# Patient Record
Sex: MALE | Race: WHITE | Employment: FULL TIME | URBAN - METROPOLITAN AREA
[De-identification: names, ages, dates, MRNs, and addresses within clinical notes are randomized per-mention and may not be internally consistent; named-entity substitution may affect disease eponyms.]

---

## 2022-03-18 PROBLEM — N13.30 HYDROURETERONEPHROSIS: Status: ACTIVE | Noted: 2021-09-03

## 2022-03-18 PROBLEM — N20.0 BILATERAL NEPHROLITHIASIS: Status: ACTIVE | Noted: 2021-09-06

## 2022-03-19 PROBLEM — R10.84 ABDOMINAL PAIN, GENERALIZED: Status: ACTIVE | Noted: 2021-09-04

## 2022-03-20 PROBLEM — N20.0 NEPHROLITHIASIS: Status: ACTIVE | Noted: 2021-09-02

## 2022-05-04 PROBLEM — F17.200 TOBACCO DEPENDENCE SYNDROME: Status: ACTIVE | Noted: 2022-05-04

## 2022-05-04 PROBLEM — R51.9 TEMPORAL HEADACHE: Status: ACTIVE | Noted: 2019-02-13

## 2022-05-04 PROBLEM — M54.16 LUMBAR RADICULOPATHY: Status: ACTIVE | Noted: 2019-02-13

## 2022-05-04 PROBLEM — I73.00 RAYNAUD'S PHENOMENON: Status: ACTIVE | Noted: 2022-05-04

## 2022-05-04 PROBLEM — R06.00 DYSPNEA: Status: ACTIVE | Noted: 2019-02-13

## 2022-05-04 PROBLEM — T14.8XXA MUSCLE TEAR: Status: ACTIVE | Noted: 2019-10-22

## 2022-05-04 PROBLEM — L02.91 ABSCESS: Status: ACTIVE | Noted: 2022-05-04

## 2022-05-04 PROBLEM — M54.2 NECK PAIN: Status: ACTIVE | Noted: 2019-02-13

## 2022-05-04 PROBLEM — R07.89 CHEST WALL PAIN: Status: ACTIVE | Noted: 2019-02-13

## 2022-05-04 PROBLEM — J11.1 INFLUENZA-LIKE ILLNESS: Status: ACTIVE | Noted: 2022-05-04

## 2023-06-30 ENCOUNTER — OFFICE VISIT (OUTPATIENT)
Age: 43
End: 2023-06-30
Payer: MEDICAID

## 2023-06-30 VITALS
TEMPERATURE: 98.3 F | RESPIRATION RATE: 17 BRPM | WEIGHT: 152.8 LBS | HEART RATE: 70 BPM | BODY MASS INDEX: 23.98 KG/M2 | SYSTOLIC BLOOD PRESSURE: 102 MMHG | DIASTOLIC BLOOD PRESSURE: 63 MMHG | OXYGEN SATURATION: 95 % | HEIGHT: 67 IN

## 2023-06-30 DIAGNOSIS — Z78.9 TRANSGENDER: ICD-10-CM

## 2023-06-30 DIAGNOSIS — L65.9 HAIR LOSS: Primary | ICD-10-CM

## 2023-06-30 DIAGNOSIS — N52.9 ERECTILE DYSFUNCTION, UNSPECIFIED ERECTILE DYSFUNCTION TYPE: ICD-10-CM

## 2023-06-30 PROCEDURE — 99203 OFFICE O/P NEW LOW 30 MIN: CPT | Performed by: NURSE PRACTITIONER

## 2023-06-30 RX ORDER — TADALAFIL 5 MG/1
5 TABLET ORAL DAILY
COMMUNITY
End: 2023-06-30 | Stop reason: SDUPTHER

## 2023-06-30 RX ORDER — TADALAFIL 5 MG/1
5 TABLET ORAL DAILY
Qty: 30 TABLET | Refills: 1 | Status: SHIPPED | OUTPATIENT
Start: 2023-06-30

## 2023-06-30 RX ORDER — MINOXIDIL 2.5 MG/1
2.5 TABLET ORAL DAILY
Qty: 30 TABLET | Refills: 1 | Status: SHIPPED | OUTPATIENT
Start: 2023-06-30

## 2023-06-30 RX ORDER — MINOXIDIL 2.5 MG/1
2.5 TABLET ORAL DAILY
COMMUNITY
End: 2023-06-30 | Stop reason: SDUPTHER

## 2023-06-30 SDOH — HEALTH STABILITY: PHYSICAL HEALTH: ON AVERAGE, HOW MANY DAYS PER WEEK DO YOU ENGAGE IN MODERATE TO STRENUOUS EXERCISE (LIKE A BRISK WALK)?: 6 DAYS

## 2023-06-30 SDOH — HEALTH STABILITY: PHYSICAL HEALTH: ON AVERAGE, HOW MANY MINUTES DO YOU ENGAGE IN EXERCISE AT THIS LEVEL?: 50 MIN

## 2023-06-30 SDOH — ECONOMIC STABILITY: INCOME INSECURITY: HOW HARD IS IT FOR YOU TO PAY FOR THE VERY BASICS LIKE FOOD, HOUSING, MEDICAL CARE, AND HEATING?: NOT HARD AT ALL

## 2023-06-30 SDOH — ECONOMIC STABILITY: FOOD INSECURITY: WITHIN THE PAST 12 MONTHS, THE FOOD YOU BOUGHT JUST DIDN'T LAST AND YOU DIDN'T HAVE MONEY TO GET MORE.: NEVER TRUE

## 2023-06-30 SDOH — ECONOMIC STABILITY: FOOD INSECURITY: WITHIN THE PAST 12 MONTHS, YOU WORRIED THAT YOUR FOOD WOULD RUN OUT BEFORE YOU GOT MONEY TO BUY MORE.: NEVER TRUE

## 2023-06-30 SDOH — ECONOMIC STABILITY: HOUSING INSECURITY
IN THE LAST 12 MONTHS, WAS THERE A TIME WHEN YOU DID NOT HAVE A STEADY PLACE TO SLEEP OR SLEPT IN A SHELTER (INCLUDING NOW)?: NO

## 2023-06-30 ASSESSMENT — PATIENT HEALTH QUESTIONNAIRE - PHQ9
SUM OF ALL RESPONSES TO PHQ QUESTIONS 1-9: 0
SUM OF ALL RESPONSES TO PHQ9 QUESTIONS 1 & 2: 0
2. FEELING DOWN, DEPRESSED OR HOPELESS: 0
SUM OF ALL RESPONSES TO PHQ QUESTIONS 1-9: 0
1. LITTLE INTEREST OR PLEASURE IN DOING THINGS: 0
SUM OF ALL RESPONSES TO PHQ QUESTIONS 1-9: 0
SUM OF ALL RESPONSES TO PHQ QUESTIONS 1-9: 0

## 2023-06-30 ASSESSMENT — ENCOUNTER SYMPTOMS
CHEST TIGHTNESS: 0
ABDOMINAL PAIN: 0
COUGH: 0
SHORTNESS OF BREATH: 0
VOMITING: 0
NAUSEA: 0

## 2023-07-18 ENCOUNTER — TELEPHONE (OUTPATIENT)
Age: 43
End: 2023-07-18

## 2023-07-18 NOTE — TELEPHONE ENCOUNTER
Patient contacted the endocrinologist but they did not have his information. Patient requesting information to be faxed so he can schedule his appt.

## 2023-07-26 ENCOUNTER — TELEPHONE (OUTPATIENT)
Age: 43
End: 2023-07-26

## 2023-07-26 NOTE — TELEPHONE ENCOUNTER
Patient called stating he call the endocrinologist and was adv to call pcp states they have not received the referral.

## 2023-07-28 ENCOUNTER — OFFICE VISIT (OUTPATIENT)
Age: 43
End: 2023-07-28
Payer: MEDICAID

## 2023-07-28 ENCOUNTER — HOSPITAL ENCOUNTER (OUTPATIENT)
Facility: HOSPITAL | Age: 43
End: 2023-07-28
Payer: MEDICAID

## 2023-07-28 VITALS
RESPIRATION RATE: 20 BRPM | DIASTOLIC BLOOD PRESSURE: 77 MMHG | BODY MASS INDEX: 23.23 KG/M2 | HEIGHT: 67 IN | HEART RATE: 81 BPM | SYSTOLIC BLOOD PRESSURE: 121 MMHG | TEMPERATURE: 99.3 F | OXYGEN SATURATION: 98 % | WEIGHT: 148 LBS

## 2023-07-28 DIAGNOSIS — R05.1 ACUTE COUGH: ICD-10-CM

## 2023-07-28 DIAGNOSIS — G47.00 INSOMNIA, UNSPECIFIED TYPE: ICD-10-CM

## 2023-07-28 DIAGNOSIS — Z09 POSTOP CHECK: ICD-10-CM

## 2023-07-28 DIAGNOSIS — F32.A DEPRESSION, UNSPECIFIED DEPRESSION TYPE: Primary | ICD-10-CM

## 2023-07-28 PROCEDURE — 99211 OFF/OP EST MAY X REQ PHY/QHP: CPT

## 2023-07-28 PROCEDURE — 99213 OFFICE O/P EST LOW 20 MIN: CPT | Performed by: NURSE PRACTITIONER

## 2023-07-28 PROCEDURE — 71046 X-RAY EXAM CHEST 2 VIEWS: CPT

## 2023-07-28 RX ORDER — SERTRALINE HYDROCHLORIDE 25 MG/1
25 TABLET, FILM COATED ORAL DAILY
Qty: 90 TABLET | Refills: 0 | Status: SHIPPED | OUTPATIENT
Start: 2023-07-28

## 2023-07-28 ASSESSMENT — PATIENT HEALTH QUESTIONNAIRE - PHQ9
SUM OF ALL RESPONSES TO PHQ9 QUESTIONS 1 & 2: 3
SUM OF ALL RESPONSES TO PHQ QUESTIONS 1-9: 3
SUM OF ALL RESPONSES TO PHQ QUESTIONS 1-9: 3
1. LITTLE INTEREST OR PLEASURE IN DOING THINGS: 2
SUM OF ALL RESPONSES TO PHQ QUESTIONS 1-9: 3
SUM OF ALL RESPONSES TO PHQ QUESTIONS 1-9: 3
2. FEELING DOWN, DEPRESSED OR HOPELESS: 1

## 2023-07-28 ASSESSMENT — ENCOUNTER SYMPTOMS: COUGH: 1

## 2023-07-28 NOTE — PROGRESS NOTES
1. \"Have you been to the ER, urgent care clinic since your last visit? Hospitalized since your last visit? \" No    2. \"Have you seen or consulted any other health care providers outside of the [address] [place] [address] since your last visit? \" Yes , therapist       3. For patients aged 43-73: Has the patient had a colonoscopy / FIT/ Cologuard?  NA - based on age

## 2023-07-28 NOTE — PROGRESS NOTES
Devon Talbot (:  1980) is a 43 y.o. adult, here for evaluation of the following medical concerns:  Chief Complaint   Patient presents with    Cough     Intermittent and chest heavy feeling. While in surgery the anesthesiologist mentioned she heard crackling noises. Oral Swelling     Knot feeling on the tip of tongue    Depression         ASSESSMENT/PLAN:  1. Depression, unspecified depression type    - sertraline (ZOLOFT) 25 MG tablet; Take 1 tablet by mouth daily  Dispense: 90 tablet; Refill: 0    2. Insomnia, unspecified type    - sertraline (ZOLOFT) 25 MG tablet; Take 1 tablet by mouth daily  Dispense: 90 tablet; Refill: 0    3. Acute cough    - XR CHEST (2 VIEWS); Future    4. Postop check    - XR CHEST (2 VIEWS); Future- xray is wnl      Return in about 2 weeks (around 2023) for MED F/U. Cough    Depression  Tuesday with breast augmentation correction, sites look CDI. Noted with cough, intermittent and chest heavy feeling post anesthesiologist.       Restarted Estradiol as was getting mean, aggressive, noted by work family. Endocrinology is still pending. Will set up appt today. Restarting with psychology next week. Recent loss of girlfriend. Completed masters degree  Took this medication in the past-  Lithum  Adderall  Zoloft  Ritilan  Trazosone  Valium    With hx of insomnia failed otc therapy. Waking every couple of hours. Thought to trial SSRI    Currently on keflex taking every 12 hours has 3 days left            Review of Systems   Respiratory:  Positive for cough. Psychiatric/Behavioral:  Positive for depression. Prior to Visit Medications    Medication Sig Taking?  Authorizing Provider   Estradiol 4 MCG INST Place vaginally Yes Historical Provider, MD   sertraline (ZOLOFT) 25 MG tablet Take 1 tablet by mouth daily Yes CHRIS Sharma NP   tadalafil (CIALIS) 5 MG tablet Take 1 tablet by mouth daily Yes CHRIS Sharma NP   minoxidil (Vera Frank)

## 2023-08-28 DIAGNOSIS — G47.00 INSOMNIA, UNSPECIFIED TYPE: ICD-10-CM

## 2023-08-28 DIAGNOSIS — F32.A DEPRESSION, UNSPECIFIED DEPRESSION TYPE: ICD-10-CM

## 2023-08-28 RX ORDER — SERTRALINE HYDROCHLORIDE 25 MG/1
25 TABLET, FILM COATED ORAL DAILY
Qty: 90 TABLET | Refills: 0 | OUTPATIENT
Start: 2023-08-28

## 2023-09-07 NOTE — TELEPHONE ENCOUNTER
Last appointment: 07/28/2023    Next appointment: 09/22/2023    Patient requesting refill for     sildenafil 25mg     [facility] [address] [place] [address], [address] [place] [address],[address] [place], [place] [id number]   Phone:  [phone]  Fax:  [phone]

## 2023-09-15 RX ORDER — SILDENAFIL 25 MG/1
25 TABLET, FILM COATED ORAL PRN
COMMUNITY

## 2023-09-15 RX ORDER — SILDENAFIL 25 MG/1
25 TABLET, FILM COATED ORAL PRN
Qty: 30 TABLET | Refills: 0 | OUTPATIENT
Start: 2023-09-15

## 2023-09-22 ENCOUNTER — OFFICE VISIT (OUTPATIENT)
Age: 43
End: 2023-09-22
Payer: MEDICAID

## 2023-09-22 VITALS
TEMPERATURE: 97.5 F | SYSTOLIC BLOOD PRESSURE: 107 MMHG | DIASTOLIC BLOOD PRESSURE: 63 MMHG | HEART RATE: 68 BPM | RESPIRATION RATE: 17 BRPM | WEIGHT: 149.8 LBS | OXYGEN SATURATION: 97 % | BODY MASS INDEX: 23.51 KG/M2 | HEIGHT: 67 IN

## 2023-09-22 DIAGNOSIS — R09.89 BILATERAL CAROTID BRUITS: ICD-10-CM

## 2023-09-22 DIAGNOSIS — Z23 IMMUNIZATION DUE: ICD-10-CM

## 2023-09-22 DIAGNOSIS — R01.1 CARDIAC MURMUR: ICD-10-CM

## 2023-09-22 DIAGNOSIS — Z00.00 ANNUAL PHYSICAL EXAM: ICD-10-CM

## 2023-09-22 DIAGNOSIS — Z12.5 PROSTATE CANCER SCREENING: ICD-10-CM

## 2023-09-22 DIAGNOSIS — Z11.59 ENCOUNTER FOR HEPATITIS C SCREENING TEST FOR LOW RISK PATIENT: Primary | ICD-10-CM

## 2023-09-22 PROCEDURE — 99211 OFF/OP EST MAY X REQ PHY/QHP: CPT

## 2023-09-22 PROCEDURE — 99396 PREV VISIT EST AGE 40-64: CPT | Performed by: NURSE PRACTITIONER

## 2023-09-22 ASSESSMENT — PATIENT HEALTH QUESTIONNAIRE - PHQ9
SUM OF ALL RESPONSES TO PHQ QUESTIONS 1-9: 0
SUM OF ALL RESPONSES TO PHQ9 QUESTIONS 1 & 2: 0
2. FEELING DOWN, DEPRESSED OR HOPELESS: 0
SUM OF ALL RESPONSES TO PHQ QUESTIONS 1-9: 0
1. LITTLE INTEREST OR PLEASURE IN DOING THINGS: 0

## 2023-09-22 ASSESSMENT — ENCOUNTER SYMPTOMS
SHORTNESS OF BREATH: 0
COUGH: 0
CHEST TIGHTNESS: 0
NAUSEA: 0
VOMITING: 0
ABDOMINAL PAIN: 0

## 2023-09-22 NOTE — PROGRESS NOTES
Tamia Chang (:  1980) is a 37 y.o. adult, here for evaluation of the following medical concerns:  Chief Complaint   Patient presents with    Annual Exam         ASSESSMENT/PLAN:  1. Encounter for hepatitis C screening test for low risk patient    - Hepatitis Be antigen; Future    2. Annual physical exam    - CBC with Auto Differential; Future  - Comprehensive Metabolic Panel; Future  - Hemoglobin A1C; Future  - Lipid Panel; Future  - TSH; Future  - PSA Screening; Future  - Hepatitis C Antibody; Future  - Hepatitis Be antigen; Future  - HIV 1/2 Ag/Ab, 4TH Generation,W Rflx Confirm; Future  - T. pallidum Ab; Future  - Chlamydia, Gonorrhea, Trichomoniasis; Future  - Urinalysis with Microscopic; Future    3. Prostate cancer screening    - PSA Screening; Future    4. Immunization due        Return in about 1 year (around 2024), or if symptoms worsen or fail to improve, for [id number], [facility] VISIT. HPI  They are here today for annual physical exam  Manoj Costello is a transgender male to female. Breast augmentation, referred to endocrinology. Stopped Estradiol. Restarting with psychology next week. Recent loss of girlfriend. Completed masters degree  Took this medication in the past-  Lithum  Adderall  Zoloft  Ritilan  Trazosone  Valium     With hx of insomnia failed otc therapy. Waking every couple of hours. Thought to trial SSRI- started on Zoloft       Review of Systems   Constitutional:  Negative for diaphoresis and fatigue. Respiratory:  Negative for cough, chest tightness and shortness of breath. Cardiovascular:  Negative for chest pain, palpitations and leg swelling. Gastrointestinal:  Negative for abdominal pain, nausea and vomiting. Neurological:  Negative for dizziness, weakness and headaches. Prior to Visit Medications    Medication Sig Taking?  Authorizing Provider   sildenafil (VIAGRA) 25 MG tablet Take 1 tablet by mouth as needed for Erectile Dysfunction

## 2023-09-22 NOTE — PROGRESS NOTES
1. \"Have you been to the ER, urgent care clinic since your last visit? Hospitalized since your last visit? \" No    2. \"Have you seen or consulted any other health care providers outside of the [address] [place] [address] since your last visit? \" Yes therapist.       3. For patients aged 43-73: Has the patient had a colonoscopy / FIT/ Cologuard?  NA - based on age

## 2023-09-25 LAB
A/G RATIO: 2.1 RATIO (ref 1.1–2.6)
ALBUMIN SERPL-MCNC: 5 G/DL (ref 3.5–5)
ALP BLD-CCNC: 65 U/L (ref 25–115)
ALT SERPL-CCNC: 19 U/L (ref 5–40)
ANION GAP SERPL CALCULATED.3IONS-SCNC: 10 MMOL/L (ref 3–15)
AST SERPL-CCNC: 26 U/L (ref 10–37)
AVERAGE GLUCOSE: 111 MG/DL (ref 91–123)
BACTERIA: NEGATIVE
BASOPHILS # BLD: 1 % (ref 0–2)
BASOPHILS ABSOLUTE: 0.1 K/UL (ref 0–0.2)
BILIRUB SERPL-MCNC: 0.4 MG/DL (ref 0.2–1.2)
BILIRUB SERPL-MCNC: NEGATIVE MG/DL
BLOOD: NEGATIVE
BUN BLDV-MCNC: 9 MG/DL (ref 6–22)
CALCIUM SERPL-MCNC: 9.7 MG/DL (ref 8.4–10.5)
CHLAMYDIA AMPLIFIED URINE: NEGATIVE
CHLORIDE BLD-SCNC: 102 MMOL/L (ref 98–110)
CHOLESTEROL/HDL RATIO: 4.7 (ref 0–5)
CHOLESTEROL: 198 MG/DL (ref 110–200)
CLARITY: CLEAR
CO2: 31 MMOL/L (ref 20–32)
COLOR: YELLOW
CREAT SERPL-MCNC: 0.8 MG/DL (ref 0.5–1.2)
EOSINOPHIL # BLD: 2 % (ref 0–6)
EOSINOPHILS ABSOLUTE: 0.1 K/UL (ref 0–0.5)
EPITHELIAL CELLS: NORMAL /HPF
GC AMPLIFIED URINE: NEGATIVE
GLOBULIN: 2.4 G/DL (ref 2–4)
GLOMERULAR FILTRATION RATE: >60 ML/MIN/1.73 SQ.M.
GLUCOSE: 95 MG/DL (ref 70–99)
GLUCOSE: NEGATIVE MG/DL
HBA1C MFR BLD: 5.5 % (ref 4.8–5.6)
HCT VFR BLD CALC: 46.4 % (ref 36.6–51.9)
HDLC SERPL-MCNC: 42 MG/DL
HEMOGLOBIN: 14 G/DL (ref 13.2–17.3)
HEPATITIS BE ANTIGEN: NONREACTIVE
HEPATITIS C ANTIBODY: NORMAL
HIV -1/0/2 AG/AB WITH REFLEX: NON REACTIVE
HIV INTERPRETATION: NORMAL
HYALINE CASTS: NORMAL /LPF (ref 0–2)
KETONES, URINE: NEGATIVE MG/DL
LDL CHOLESTEROL CALCULATED: 102 MG/DL (ref 50–99)
LDL/HDL RATIO: 2.4
LEUKOCYTE ESTERASE, URINE: NEGATIVE
LYMPHOCYTES # BLD: 28 % (ref 20–45)
LYMPHOCYTES ABSOLUTE: 1.7 K/UL (ref 1–4.8)
MCH RBC QN AUTO: 30 PG (ref 26–34)
MCHC RBC AUTO-ENTMCNC: 30 G/DL (ref 31–36)
MCV RBC AUTO: 99 FL (ref 80–95)
MONOCYTES ABSOLUTE: 0.5 K/UL (ref 0.1–1)
MONOCYTES: 9 % (ref 3–12)
NEUTROPHILS ABSOLUTE: 3.7 K/UL (ref 1.8–7.7)
NEUTROPHILS: 61 % (ref 40–75)
NITRITE, URINE: NEGATIVE
NON-HDL CHOLESTEROL: 156 MG/DL
PDW BLD-RTO: 14.6 % (ref 10–15.5)
PH, URINE: 6.5 PH (ref 5–8)
PLATELET # BLD: 235 K/UL (ref 140–440)
PMV BLD AUTO: 13.4 FL (ref 9–13)
POTASSIUM SERPL-SCNC: 4.3 MMOL/L (ref 3.5–5.5)
PROSTATE SPECIFIC ANTIGEN: 0.85 NG/ML
PROTEIN UA: NEGATIVE MG/DL
RBC URINE: NORMAL /HPF
RBC: 4.68 M/UL (ref 3.8–5.8)
SODIUM BLD-SCNC: 143 MMOL/L (ref 133–145)
SPECIFIC GRAVITY: 1.02 (ref 1–1.03)
T. PALLIDUM, IGG/IGM: NON REACTIVE
TOTAL PROTEIN: 7.4 G/DL (ref 6.4–8.3)
TRICH NUCU: NEGATIVE
TRIGL SERPL-MCNC: 267 MG/DL (ref 40–149)
TSH SERPL DL<=0.05 MIU/L-ACNC: 1.04 MCU/ML (ref 0.27–4.2)
UROBILINOGEN: 0.2 MG/DL
VLDLC SERPL CALC-MCNC: 53 MG/DL (ref 8–30)
WBC UA: NORMAL /HPF (ref 0–2)
WBC: 6.2 K/UL (ref 4–11)

## 2023-09-29 ENCOUNTER — OFFICE VISIT (OUTPATIENT)
Age: 43
End: 2023-09-29
Payer: MEDICAID

## 2023-09-29 VITALS
HEIGHT: 67 IN | OXYGEN SATURATION: 98 % | SYSTOLIC BLOOD PRESSURE: 100 MMHG | DIASTOLIC BLOOD PRESSURE: 80 MMHG | HEART RATE: 66 BPM | BODY MASS INDEX: 23.39 KG/M2 | WEIGHT: 149 LBS

## 2023-09-29 DIAGNOSIS — R01.1 CARDIAC MURMUR: Primary | ICD-10-CM

## 2023-09-29 DIAGNOSIS — R09.89 BILATERAL CAROTID BRUITS: ICD-10-CM

## 2023-09-29 PROCEDURE — 99204 OFFICE O/P NEW MOD 45 MIN: CPT | Performed by: INTERNAL MEDICINE

## 2023-09-29 PROCEDURE — 93000 ELECTROCARDIOGRAM COMPLETE: CPT | Performed by: INTERNAL MEDICINE

## 2023-09-29 ASSESSMENT — PATIENT HEALTH QUESTIONNAIRE - PHQ9
10. IF YOU CHECKED OFF ANY PROBLEMS, HOW DIFFICULT HAVE THESE PROBLEMS MADE IT FOR YOU TO DO YOUR WORK, TAKE CARE OF THINGS AT HOME, OR GET ALONG WITH OTHER PEOPLE: 0
7. TROUBLE CONCENTRATING ON THINGS, SUCH AS READING THE NEWSPAPER OR WATCHING TELEVISION: 0
1. LITTLE INTEREST OR PLEASURE IN DOING THINGS: 0
SUM OF ALL RESPONSES TO PHQ QUESTIONS 1-9: 0
6. FEELING BAD ABOUT YOURSELF - OR THAT YOU ARE A FAILURE OR HAVE LET YOURSELF OR YOUR FAMILY DOWN: 0
8. MOVING OR SPEAKING SO SLOWLY THAT OTHER PEOPLE COULD HAVE NOTICED. OR THE OPPOSITE, BEING SO FIGETY OR RESTLESS THAT YOU HAVE BEEN MOVING AROUND A LOT MORE THAN USUAL: 0
SUM OF ALL RESPONSES TO PHQ QUESTIONS 1-9: 0
2. FEELING DOWN, DEPRESSED OR HOPELESS: 0
SUM OF ALL RESPONSES TO PHQ QUESTIONS 1-9: 0
3. TROUBLE FALLING OR STAYING ASLEEP: 0
5. POOR APPETITE OR OVEREATING: 0
9. THOUGHTS THAT YOU WOULD BE BETTER OFF DEAD, OR OF HURTING YOURSELF: 0
SUM OF ALL RESPONSES TO PHQ9 QUESTIONS 1 & 2: 0
4. FEELING TIRED OR HAVING LITTLE ENERGY: 0
SUM OF ALL RESPONSES TO PHQ QUESTIONS 1-9: 0

## 2023-09-29 NOTE — PROGRESS NOTES
Arita Sacks presents today for   Chief Complaint   Patient presents with    New Patient     Est care referred by pcp due to bilateral carotid bruits and cardiac murmur     Shortness of Breath     SOB with exertion        Arita Sacks preferred language for health care discussion is english/other. Is someone accompanying this pt? no    Is the patient using any DME equipment during OV? no    Depression Screening:  Depression: Not at risk (9/29/2023)    PHQ-2     PHQ-2 Score: 0   Recent Concern: Depression - At risk (7/28/2023)    PHQ-2     PHQ-2 Score: 3        Learning Assessment:  Who is the primary learner? Patient    What is the preferred language for health care of the primary learner? ENGLISH    How does the primary learner prefer to learn new concepts? DEMONSTRATION    Answered By patient    Relationship to Learner SELF           Pt currently taking Anticoagulant therapy? no    Pt currently taking Antiplatelet therapy ? no      Coordination of Care:  1. Have you been to the ER, urgent care clinic since your last visit? Hospitalized since your last visit? no    2. Have you seen or consulted any other health care providers outside of the [address] [place] [address] since your last visit? Include any pap smears or colon screening.  no
with:    Murmur  IVCD/ nonischemic WNL ekg  Nicotine  Depression    Echo, will call with results if benign can see me as needed  Further recs to follow pending testing    Thank you for allowing me to participate in the care of your patient, please do not hesitate to call with questions or concerns.     [facility],    Nabila Gomes, DO

## 2023-11-04 DIAGNOSIS — G47.00 INSOMNIA, UNSPECIFIED TYPE: ICD-10-CM

## 2023-11-04 DIAGNOSIS — F32.A DEPRESSION, UNSPECIFIED DEPRESSION TYPE: ICD-10-CM

## 2023-11-06 NOTE — TELEPHONE ENCOUNTER
Last Visit: 09- OV   Next Appointment: none  Previous Refill Encounter: 07- #90tabs with 0 refills      Requested Prescriptions     Pending Prescriptions Disp Refills    sertraline (ZOLOFT) 25 MG tablet 90 tablet 0     Sig: Take 1 tablet by mouth daily

## 2023-11-07 RX ORDER — SERTRALINE HYDROCHLORIDE 25 MG/1
25 TABLET, FILM COATED ORAL DAILY
Qty: 90 TABLET | Refills: 1 | Status: SHIPPED | OUTPATIENT
Start: 2023-11-07

## 2023-12-11 NOTE — TELEPHONE ENCOUNTER
Last Visit: 09- OV   Next Appointment: none  Previous Refill Encounter: 09- #30 tabs with 0 refills      Requested Prescriptions     Pending Prescriptions Disp Refills    sildenafil (VIAGRA) 25 MG tablet [Pharmacy Med Name: Sildenafil Citrate 25 MG Oral Tablet] 30 tablet 0     Sig: TAKE 1 TABLET BY MOUTH AS NEEDED FOR ERECTILE DYSFUNCTION

## 2023-12-15 RX ORDER — SILDENAFIL 25 MG/1
25 TABLET, FILM COATED ORAL PRN
Qty: 30 TABLET | Refills: 0 | OUTPATIENT
Start: 2023-12-15

## 2023-12-17 RX ORDER — SILDENAFIL 25 MG/1
25 TABLET, FILM COATED ORAL PRN
Qty: 30 TABLET | Refills: 0 | Status: SHIPPED | OUTPATIENT
Start: 2023-12-17

## 2024-01-24 ENCOUNTER — OFFICE VISIT (OUTPATIENT)
Age: 44
End: 2024-01-24
Payer: MEDICAID

## 2024-01-24 VITALS
DIASTOLIC BLOOD PRESSURE: 70 MMHG | RESPIRATION RATE: 17 BRPM | BODY MASS INDEX: 24.23 KG/M2 | SYSTOLIC BLOOD PRESSURE: 117 MMHG | HEART RATE: 74 BPM | OXYGEN SATURATION: 97 % | HEIGHT: 67 IN | TEMPERATURE: 99 F | WEIGHT: 154.4 LBS

## 2024-01-24 DIAGNOSIS — F41.9 ANXIETY: ICD-10-CM

## 2024-01-24 DIAGNOSIS — F32.A DEPRESSION, UNSPECIFIED DEPRESSION TYPE: Primary | ICD-10-CM

## 2024-01-24 DIAGNOSIS — R40.0 DAYTIME SLEEPINESS: ICD-10-CM

## 2024-01-24 DIAGNOSIS — N52.9 ERECTILE DYSFUNCTION, UNSPECIFIED ERECTILE DYSFUNCTION TYPE: ICD-10-CM

## 2024-01-24 DIAGNOSIS — R06.83 SNORING: ICD-10-CM

## 2024-01-24 DIAGNOSIS — Z23 IMMUNIZATION DUE: ICD-10-CM

## 2024-01-24 DIAGNOSIS — G47.00 INSOMNIA, UNSPECIFIED TYPE: ICD-10-CM

## 2024-01-24 PROCEDURE — 99214 OFFICE O/P EST MOD 30 MIN: CPT | Performed by: NURSE PRACTITIONER

## 2024-01-24 NOTE — PROGRESS NOTES
1. \"Have you been to the ER, urgent care clinic since your last visit?  Hospitalized since your last visit?\" No    2. \"Have you seen or consulted any other health care providers outside of the [facility] since your last visit?\" Yes endocrinologist and therapist Denia walter       3. For patients aged 45-75: Has the patient had a colonoscopy / FIT/ Cologuard? NA - based on age    
UROLOGICAL SURGERY  09/03/2021    CYSTOSCOPY, BILATERAL RETROGRADE PYELOGRAMS, BILATERAL URETEROSCOPIES AND LASER LITHOTRIPSY, Bilateral 6fr x 26cm stents; Dr. Avila     Family History   Problem Relation Age of Onset    Osteoarthritis Mother         One of many problems    Lung Disease Father     Asthma Father     Cancer Father         Prostate - Current    No Known Problems Sister     No Known Problems Brother        Lab Results   Component Value Date    WBC 6.2 09/22/2023    HGB 14.0 09/22/2023    HCT 46.4 09/22/2023    MCV 99 (H) 09/22/2023     09/22/2023    LABLYMP 28 09/22/2023    LYMPHOPCT 39 09/07/2021    RBC 4.68 09/22/2023    MCH 30 09/22/2023    MCHC 30 (L) 09/22/2023    RDW 14.6 09/22/2023     Lab Results   Component Value Date     09/22/2023    K 4.3 09/22/2023     09/22/2023    CO2 31 09/22/2023    BUN 9 09/22/2023    CREATININE 0.8 09/22/2023    GLUCOSE 95 09/22/2023    CALCIUM 9.7 09/22/2023    PROT 7.4 09/22/2023    LABALBU 5.0 09/22/2023    BILITOT 0.4 09/22/2023    ALKPHOS 65 09/22/2023    AST 26 09/22/2023    ALT 19 09/22/2023    GFRAA >60 09/07/2021    AGRATIO 2.1 09/22/2023    GLOB 2.4 09/22/2023     Lab Results   Component Value Date    TSH 1.04 09/22/2023     Hemoglobin A1C   Date Value Ref Range Status   09/22/2023 5.5 4.8 - 5.6 % Final     Lab Results   Component Value Date    CHOL 198 09/22/2023     Lab Results   Component Value Date    TRIG 267 (H) 09/22/2023     Lab Results   Component Value Date    HDL 42 09/22/2023     Lab Results   Component Value Date    LDLCALC 102 (H) 09/22/2023     No results found for: \"VLDL\"    Lab Results   Component Value Date    CHOLHDLRATIO 4.7 09/22/2023         Physical Exam  Constitutional:       General: Urszula Bennett is not in acute distress.     Appearance: Normal appearance.   HENT:      Head: Normocephalic and atraumatic.   Pulmonary:      Effort: Pulmonary effort is normal.   Neurological:      General: No focal deficit present.

## 2024-03-05 RX ORDER — SILDENAFIL 25 MG/1
25 TABLET, FILM COATED ORAL PRN
Qty: 30 TABLET | Refills: 0 | OUTPATIENT
Start: 2024-03-05

## 2024-03-08 RX ORDER — SILDENAFIL 25 MG/1
25 TABLET, FILM COATED ORAL PRN
Qty: 30 TABLET | Refills: 0 | OUTPATIENT
Start: 2024-03-08

## 2024-03-19 DIAGNOSIS — N52.9 ERECTILE DYSFUNCTION, UNSPECIFIED ERECTILE DYSFUNCTION TYPE: Primary | ICD-10-CM

## 2024-03-19 RX ORDER — SILDENAFIL 50 MG/1
TABLET, FILM COATED ORAL
Qty: 30 TABLET | Refills: 0 | Status: SHIPPED | OUTPATIENT
Start: 2024-03-19

## 2024-09-23 ENCOUNTER — OFFICE VISIT (OUTPATIENT)
Facility: CLINIC | Age: 44
End: 2024-09-23
Payer: COMMERCIAL

## 2024-09-23 VITALS
RESPIRATION RATE: 16 BRPM | TEMPERATURE: 97.7 F | HEART RATE: 61 BPM | BODY MASS INDEX: 24.33 KG/M2 | SYSTOLIC BLOOD PRESSURE: 123 MMHG | OXYGEN SATURATION: 98 % | DIASTOLIC BLOOD PRESSURE: 70 MMHG | WEIGHT: 155 LBS | HEIGHT: 67 IN

## 2024-09-23 DIAGNOSIS — Z23 IMMUNIZATION DUE: ICD-10-CM

## 2024-09-23 DIAGNOSIS — Z00.00 ANNUAL PHYSICAL EXAM: Primary | ICD-10-CM

## 2024-09-23 DIAGNOSIS — Z12.5 PROSTATE CANCER SCREENING: ICD-10-CM

## 2024-09-23 DIAGNOSIS — B07.9 WARTS OF FOOT: ICD-10-CM

## 2024-09-23 DIAGNOSIS — L82.1 SEBORRHEIC KERATOSIS: ICD-10-CM

## 2024-09-23 PROCEDURE — 99396 PREV VISIT EST AGE 40-64: CPT | Performed by: NURSE PRACTITIONER

## 2024-09-23 SDOH — ECONOMIC STABILITY: INCOME INSECURITY: HOW HARD IS IT FOR YOU TO PAY FOR THE VERY BASICS LIKE FOOD, HOUSING, MEDICAL CARE, AND HEATING?: NOT VERY HARD

## 2024-09-23 SDOH — ECONOMIC STABILITY: FOOD INSECURITY: WITHIN THE PAST 12 MONTHS, YOU WORRIED THAT YOUR FOOD WOULD RUN OUT BEFORE YOU GOT MONEY TO BUY MORE.: NEVER TRUE

## 2024-09-23 SDOH — ECONOMIC STABILITY: FOOD INSECURITY: WITHIN THE PAST 12 MONTHS, THE FOOD YOU BOUGHT JUST DIDN'T LAST AND YOU DIDN'T HAVE MONEY TO GET MORE.: NEVER TRUE

## 2024-09-23 ASSESSMENT — ENCOUNTER SYMPTOMS
COUGH: 0
CHEST TIGHTNESS: 0
SHORTNESS OF BREATH: 0
NAUSEA: 0
VOMITING: 0
ABDOMINAL PAIN: 0

## 2024-10-03 ENCOUNTER — HOSPITAL ENCOUNTER (OUTPATIENT)
Facility: HOSPITAL | Age: 44
Setting detail: SPECIMEN
Discharge: HOME | End: 2024-10-06
Payer: COMMERCIAL

## 2024-10-03 DIAGNOSIS — Z12.5 PROSTATE CANCER SCREENING: ICD-10-CM

## 2024-10-03 DIAGNOSIS — Z00.00 ANNUAL PHYSICAL EXAM: ICD-10-CM

## 2024-10-03 LAB
ALBUMIN SERPL-MCNC: 4.4 G/DL (ref 3.4–5)
ALBUMIN/GLOB SERPL: 1.5 (ref 0.8–1.7)
ALP SERPL-CCNC: 53 U/L (ref 45–117)
ALT SERPL-CCNC: 33 U/L (ref 16–61)
ANION GAP SERPL CALC-SCNC: 3 MMOL/L (ref 3–18)
AST SERPL-CCNC: 23 U/L (ref 10–38)
BASOPHILS # BLD: 0.1 K/UL (ref 0–0.1)
BASOPHILS NFR BLD: 1 % (ref 0–2)
BILIRUB SERPL-MCNC: 0.4 MG/DL (ref 0.2–1)
BUN SERPL-MCNC: 14 MG/DL (ref 7–18)
BUN/CREAT SERPL: 15 (ref 12–20)
CALCIUM SERPL-MCNC: 9.5 MG/DL (ref 8.5–10.1)
CHLORIDE SERPL-SCNC: 105 MMOL/L (ref 100–111)
CHOLEST SERPL-MCNC: 202 MG/DL
CO2 SERPL-SCNC: 30 MMOL/L (ref 21–32)
CREAT SERPL-MCNC: 0.92 MG/DL (ref 0.6–1.3)
DIFFERENTIAL METHOD BLD: ABNORMAL
EOSINOPHIL # BLD: 0.1 K/UL (ref 0–0.4)
EOSINOPHIL NFR BLD: 2 % (ref 0–5)
ERYTHROCYTE [DISTWIDTH] IN BLOOD BY AUTOMATED COUNT: 13.6 % (ref 11.6–14.5)
EST. AVERAGE GLUCOSE BLD GHB EST-MCNC: 111 MG/DL
GLOBULIN SER CALC-MCNC: 2.9 G/DL (ref 2–4)
GLUCOSE SERPL-MCNC: 100 MG/DL (ref 74–99)
HBA1C MFR BLD: 5.5 % (ref 4.2–5.6)
HCT VFR BLD AUTO: 47.1 % (ref 36–48)
HDLC SERPL-MCNC: 53 MG/DL (ref 40–60)
HDLC SERPL: 3.8 (ref 0–5)
HGB BLD-MCNC: 14.7 G/DL (ref 13–16)
IMM GRANULOCYTES # BLD AUTO: 0 K/UL (ref 0–0.04)
IMM GRANULOCYTES NFR BLD AUTO: 0 % (ref 0–0.5)
LDLC SERPL CALC-MCNC: 126.2 MG/DL (ref 0–100)
LIPID PANEL: ABNORMAL
LYMPHOCYTES # BLD: 1.3 K/UL (ref 0.9–3.6)
LYMPHOCYTES NFR BLD: 24 % (ref 21–52)
MCH RBC QN AUTO: 29.6 PG (ref 24–34)
MCHC RBC AUTO-ENTMCNC: 31.2 G/DL (ref 31–37)
MCV RBC AUTO: 95 FL (ref 78–100)
MONOCYTES # BLD: 0.5 K/UL (ref 0.05–1.2)
MONOCYTES NFR BLD: 10 % (ref 3–10)
NEUTS SEG # BLD: 3.5 K/UL (ref 1.8–8)
NEUTS SEG NFR BLD: 63 % (ref 40–73)
NRBC # BLD: 0 K/UL (ref 0–0.01)
NRBC BLD-RTO: 0 PER 100 WBC
PLATELET # BLD AUTO: 246 K/UL (ref 135–420)
PMV BLD AUTO: 12.4 FL (ref 9.2–11.8)
POTASSIUM SERPL-SCNC: 4.8 MMOL/L (ref 3.5–5.5)
PROT SERPL-MCNC: 7.3 G/DL (ref 6.4–8.2)
PSA SERPL-MCNC: 0.8 NG/ML (ref 0–4)
RBC # BLD AUTO: 4.96 M/UL (ref 4.35–5.65)
SODIUM SERPL-SCNC: 138 MMOL/L (ref 136–145)
TRIGL SERPL-MCNC: 114 MG/DL
VLDLC SERPL CALC-MCNC: 22.8 MG/DL
WBC # BLD AUTO: 5.5 K/UL (ref 4.6–13.2)

## 2024-10-03 PROCEDURE — 36415 COLL VENOUS BLD VENIPUNCTURE: CPT

## 2024-10-03 PROCEDURE — G0103 PSA SCREENING: HCPCS

## 2024-10-03 PROCEDURE — 80061 LIPID PANEL: CPT

## 2024-10-03 PROCEDURE — 85025 COMPLETE CBC W/AUTO DIFF WBC: CPT

## 2024-10-03 PROCEDURE — 83036 HEMOGLOBIN GLYCOSYLATED A1C: CPT

## 2024-10-03 PROCEDURE — 80053 COMPREHEN METABOLIC PANEL: CPT

## 2024-10-14 ENCOUNTER — OFFICE VISIT (OUTPATIENT)
Age: 44
End: 2024-10-14
Payer: COMMERCIAL

## 2024-10-14 VITALS
RESPIRATION RATE: 18 BRPM | WEIGHT: 155 LBS | DIASTOLIC BLOOD PRESSURE: 69 MMHG | OXYGEN SATURATION: 99 % | HEIGHT: 67 IN | BODY MASS INDEX: 24.33 KG/M2 | SYSTOLIC BLOOD PRESSURE: 128 MMHG | TEMPERATURE: 98.8 F | HEART RATE: 76 BPM

## 2024-10-14 DIAGNOSIS — F17.200 TOBACCO DEPENDENCE SYNDROME: ICD-10-CM

## 2024-10-14 DIAGNOSIS — R06.83 LOUD SNORING: ICD-10-CM

## 2024-10-14 DIAGNOSIS — R29.818 SUSPECTED SLEEP APNEA: Primary | ICD-10-CM

## 2024-10-14 DIAGNOSIS — F51.12 BEHAVIORALLY INDUCED INSUFFICIENT SLEEP SYNDROME: ICD-10-CM

## 2024-10-14 DIAGNOSIS — R35.1 NOCTURIA: ICD-10-CM

## 2024-10-14 DIAGNOSIS — R06.81 WITNESSED APNEIC SPELLS: ICD-10-CM

## 2024-10-14 DIAGNOSIS — G47.19 EXCESSIVE DAYTIME SLEEPINESS: ICD-10-CM

## 2024-10-14 DIAGNOSIS — J43.8 OTHER EMPHYSEMA (HCC): ICD-10-CM

## 2024-10-14 DIAGNOSIS — G47.63 BRUXISM, SLEEP-RELATED: ICD-10-CM

## 2024-10-14 PROBLEM — F90.9 ATTENTION-DEFICIT HYPERACTIVITY DISORDER: Status: ACTIVE | Noted: 2024-10-14

## 2024-10-14 PROBLEM — J44.9 CHRONIC OBSTRUCTIVE PULMONARY DISEASE (HCC): Status: ACTIVE | Noted: 2024-10-14

## 2024-10-14 PROBLEM — J45.909 ASTHMA WITHOUT STATUS ASTHMATICUS: Status: ACTIVE | Noted: 2024-10-14

## 2024-10-14 PROCEDURE — 99204 OFFICE O/P NEW MOD 45 MIN: CPT | Performed by: OTOLARYNGOLOGY

## 2024-10-14 ASSESSMENT — SLEEP AND FATIGUE QUESTIONNAIRES
HOW LIKELY ARE YOU TO NOD OFF OR FALL ASLEEP WHILE SITTING AND READING: MODERATE CHANCE OF DOZING
HOW LIKELY ARE YOU TO NOD OFF OR FALL ASLEEP WHEN YOU ARE A PASSENGER IN A CAR FOR AN HOUR WITHOUT A BREAK: HIGH CHANCE OF DOZING
HOW LIKELY ARE YOU TO NOD OFF OR FALL ASLEEP WHILE SITTING INACTIVE IN A PUBLIC PLACE: WOULD NEVER DOZE
HOW LIKELY ARE YOU TO NOD OFF OR FALL ASLEEP WHILE LYING DOWN TO REST IN THE AFTERNOON WHEN CIRCUMSTANCES PERMIT: MODERATE CHANCE OF DOZING
HOW LIKELY ARE YOU TO NOD OFF OR FALL ASLEEP WHILE SITTING QUIETLY AFTER LUNCH WITHOUT ALCOHOL: MODERATE CHANCE OF DOZING
ESS TOTAL SCORE: 12
HOW LIKELY ARE YOU TO NOD OFF OR FALL ASLEEP IN A CAR, WHILE STOPPED FOR A FEW MINUTES IN TRAFFIC: SLIGHT CHANCE OF DOZING
HOW LIKELY ARE YOU TO NOD OFF OR FALL ASLEEP WHILE WATCHING TV: MODERATE CHANCE OF DOZING
HOW LIKELY ARE YOU TO NOD OFF OR FALL ASLEEP WHILE SITTING AND TALKING TO SOMEONE: WOULD NEVER DOZE

## 2024-10-14 ASSESSMENT — PATIENT HEALTH QUESTIONNAIRE - PHQ9
SUM OF ALL RESPONSES TO PHQ9 QUESTIONS 1 & 2: 2
2. FEELING DOWN, DEPRESSED OR HOPELESS: MORE THAN HALF THE DAYS
SUM OF ALL RESPONSES TO PHQ QUESTIONS 1-9: 2
SUM OF ALL RESPONSES TO PHQ QUESTIONS 1-9: 2
1. LITTLE INTEREST OR PLEASURE IN DOING THINGS: NOT AT ALL
SUM OF ALL RESPONSES TO PHQ QUESTIONS 1-9: 2
SUM OF ALL RESPONSES TO PHQ QUESTIONS 1-9: 2

## 2024-10-14 NOTE — PATIENT INSTRUCTIONS
Please make a follow up appointment to discuss the results of your sleep study. If this is impossible for some reason, please send me a \"My Chart\" message so that I may get back with you in a timely manner.    The [facility] Sleep Lab is located in the [employer] [facility], adjacent to [facility] [place]. The lab is on the second floor. The direct number to call for sleep study related questions is: [phone].    Please call our clinic back at [phone] or send a message on [employer] if you have any questions or concerns or if you are experiencing any of the following:     You have not received a follow up appointment within 30 days prior the recommended follow up time.    If you are not tolerating treatment plan and/or not able to obtain equipment or prescribed medication(s).  if you are experiencing any difficulties with the Durable Medical Equipment  (DME) Company you may be using or is assigned to you.  Two weeks have passed and you have not received an appointment for a scheduled procedure.  Two weeks have passed since you underwent a test and/or procedure and you have not received your results.     If you are using a CPAP/BIPAP, or Home Ventilator Device- Please note the following.  Currently, many DMEs are experiencing supply chain difficulties and orders for equipment may be back logged several weeks.     Your  Durable Medical Equipment (DME ) company is supposed to provide you with replacement filters, tubing and masks. You can either call your DME when you need new supplies or you can arrange for an automatic shipment schedule.    Your need to be seen by our office at lat minimum of every 12 months in order to renew the prescription for these supplies.   Please make note of who your DME company is and their phone number.   Please make sure that you clean your mask and hosing on a regular basis.  Your DME can provide you with additional information regarding proper care and cleaning of your

## 2024-10-14 NOTE — ASSESSMENT & PLAN NOTE
[place] of 12/24. Multifactorial but probably due to moderate to severe undiagnosed/untreated obstructive sleep apnea.  This is his worst symptom/complaint.

## 2024-10-14 NOTE — PROGRESS NOTES
Urszula R Bennett presents today for   Chief Complaint   Patient presents with    Snoring    Sleep Problem       Is someone accompanying this pt? no    Is the patient using any DME equipment during OV? no    -DME Company: N/A    Have you ever had a sleep study done before? no    Depression Screening:      10/14/2024     8:49 AM   PHQ-9    Little interest or pleasure in doing things 0   Feeling down, depressed, or hopeless 2   PHQ-2 Score 2   PHQ-9 Total Score 2        [place] Sleepiness Scale:      10/14/2024     8:53 AM   Sleep Medicine   Sitting and reading 2   Watching TV 2   Sitting, inactive in a public place (e.g. a theatre or a meeting) 0   As a passenger in a car for an hour without a break 3   Lying down to rest in the afternoon when circumstances permit 2   Sitting and talking to someone 0   Sitting quietly after a lunch without alcohol 2   In a car, while stopped for a few minutes in traffic 1   [place] Sleepiness Score 12   Neck (Inches) 14       Stop-Bang:      10/14/2024     8:00 AM   STOP-BANG QUESTIONNAIRE   Are you a loud and/or regular snorer? 1   Do you often feel tired or groggy upon awakening or do you awaken with a headache? 1   Have you been observed to gasp or stop breathing during sleep? 1   Are you often tired or fatigued during wake time hours?  0   Do you fall asleep sitting, reading, watching TV or driving? 0   Do you often have problems with memory or concentration? 0   Do you have or are you being treated for high blood pressure? 0   Recent BMI (Calculated) 24.3   Is BMI greater than 35 kg/m2? 0=No   Age older than 50 years old? 0=No   Is your neck circumference greater than 17 inches (Male) or 16 inches (Female)? 0   Gender - Male 1=Yes   STOP-Bang Total Score 28.3         Coordination of Care:  1. Have you been to the ER, urgent care clinic since your last visit? Hospitalized since your last visit? no    2. Have you seen or consulted any other health care providers outside of the [facility]

## 2024-10-14 NOTE — PROGRESS NOTES
[facility]   Sleep Medicine     Office Progress Note - Initial Evaluation        [address]  [address]  [facility] [address] [phone] Fax    Reason for visit/referral: Evaluation for possible obstructive sleep apnea/sleep disordered breathing  Assessment:      1. Suspected sleep apnea  -     PAT - Home Sleep Test; Future  2. Excessive daytime sleepiness  Assessment & Plan:  [place] of 12/24. Multifactorial but probably due to moderate to severe undiagnosed/untreated obstructive sleep apnea.  This is his worst symptom/complaint.  Orders:  -     PAT - Home Sleep Test; Future  3. Loud snoring  4. Witnessed apneic spells  5. Nocturia  6. Bruxism, sleep-related  Comments:  History of  7. Tobacco dependence syndrome  Comments:  Encouraged to quit tobacco in all forms  8. Other emphysema (HCC)  Assessment & Plan:   Unclear history of this or if this is an ongoing condition and who is managing  9. Behaviorally induced insufficient sleep syndrome  Comments:  Going to bed at 11 PM when he knows he will wake up somewhere tween 5 and 5:45 AM       I have discussed the nature and definition of obstructive sleep apnea and why it would be important to evaluate for this.  We did discuss how undiagnosed/untreated obstructive sleep apnea can affect other medical conditions/disorders, and in particular, cardiovascular disorders.  The consequences of untreated obstructive sleep apnea include the increased risk of stroke, heart disease, hypertension, cognitive difficulties (attention, memory), possible endocrine difficulties to include insulin resistance and possible increased risk of diabetes, increased sleepiness and fatigue and increased risk of motor vehicle accidents.    We discussed the different kinds of sleep studies and I believe a home sleep apnea test is a reasonable option in this setting.    We did discuss not driving sleepy and how weight gain/weight loss can affect

## 2024-10-28 ENCOUNTER — TELEPHONE (OUTPATIENT)
Dept: SLEEP MEDICINE | Facility: HOSPITAL | Age: 44
End: 2024-10-28

## 2025-06-02 NOTE — PROGRESS NOTES
Have you been to the ER, urgent care clinic since your last visit?  Hospitalized since your last visit?   N/A    Have you seen or consulted any other health care providers outside our system since your last visit?   [facility]  10/14/24 Sleep Medicine  1/6/25 Urology [facility]  4/11/25 Urology [facility]         
to discuss with Urology.     Review of Systems   Musculoskeletal:  Positive for myalgias.       Prior to Visit Medications    Medication Sig Taking? Authorizing Provider   tadalafil (CIALIS) 5 MG tablet Take 1 tablet by mouth daily Yes Shaheen Allen PA   testosterone cypionate (DEPOTESTOTERONE CYPIONATE) 200 MG/ML injection Inject 0.5ml SQ weekly Yes Shaheen Allen PA   sildenafil (VIAGRA) 100 MG tablet Take 1 tablet by mouth as needed for Erectile Dysfunction Yes Shaheen Allen PA   [employer] & Syringes (EASY TOUCH SYRINGE BARREL 1ML) MISC Use to draw up Testosterone Cypionate Yes Shaheen Allen PA   NEEDLE, DISP, 25 G 25G X 5/8\" MISC Use this needle to inject Testosterone Cypionate Yes Shaheen Allen PA   NEEDLE, DISP, 18 G 18G X 1\" MISC Use this needle to draw up Testosterone Cypionate Yes Shaheen Allen PA   minoxidil (LONITEN) 2.5 MG tablet Take 1 tablet by mouth in the morning and at bedtime  Patient not taking: Reported on 1/6/2025  Seble Jacobo MD   valACYclovir HCl (VALTREX PO) Take by mouth daily  Patient not taking: Reported on 4/11/2025  Seble Jacobo MD        Social History     Tobacco Use    Smoking status: Every Day     Types: E-Cigarettes     Passive exposure: Never    Smokeless tobacco: Former   Substance Use Topics    Alcohol use: Yes     Alcohol/week: 4.0 standard drinks of alcohol     Types: 4 Glasses of wine per week        /82 (BP Site: Left Upper Arm, Patient Position: Sitting)   Pulse 76   Ht 1.702 m (5' 7\")   Wt 65.3 kg (144 lb)   SpO2 98%   BMI 22.55 kg/m²   Estimated body mass index is 22.55 kg/m² as calculated from the following:    Height as of this encounter: 1.702 m (5' 7\").    Weight as of this encounter: 65.3 kg (144 lb).  Past Medical History:   Diagnosis Date    HSV-2 (herpes simplex virus 2) infection     Nicotine vapor product user     Non-nicotine vapor product user     Prepatellar bursitis of left knee      Past

## 2025-06-03 ENCOUNTER — OFFICE VISIT (OUTPATIENT)
Facility: CLINIC | Age: 45
End: 2025-06-03
Payer: COMMERCIAL

## 2025-06-03 ENCOUNTER — HOSPITAL ENCOUNTER (OUTPATIENT)
Facility: HOSPITAL | Age: 45
Setting detail: SPECIMEN
Discharge: HOME | End: 2025-06-06
Payer: COMMERCIAL

## 2025-06-03 ENCOUNTER — RESULTS FOLLOW-UP (OUTPATIENT)
Facility: CLINIC | Age: 45
End: 2025-06-03

## 2025-06-03 VITALS
HEART RATE: 76 BPM | DIASTOLIC BLOOD PRESSURE: 82 MMHG | BODY MASS INDEX: 22.6 KG/M2 | OXYGEN SATURATION: 98 % | SYSTOLIC BLOOD PRESSURE: 125 MMHG | WEIGHT: 144 LBS | HEIGHT: 67 IN

## 2025-06-03 DIAGNOSIS — M25.50 ARTHRALGIA, UNSPECIFIED JOINT: ICD-10-CM

## 2025-06-03 DIAGNOSIS — Z20.2 STD EXPOSURE: ICD-10-CM

## 2025-06-03 DIAGNOSIS — G47.00 INSOMNIA, UNSPECIFIED TYPE: ICD-10-CM

## 2025-06-03 DIAGNOSIS — M79.10 MYALGIA: Primary | ICD-10-CM

## 2025-06-03 DIAGNOSIS — M79.10 MYALGIA: ICD-10-CM

## 2025-06-03 LAB
ALBUMIN SERPL-MCNC: 4.3 G/DL (ref 3.4–5)
ALBUMIN/GLOB SERPL: 1.3 (ref 0.8–1.7)
ALP SERPL-CCNC: 229 U/L (ref 45–117)
ALT SERPL-CCNC: 316 U/L (ref 10–50)
ANION GAP SERPL CALC-SCNC: 11 MMOL/L (ref 3–18)
AST SERPL-CCNC: 183 U/L (ref 10–38)
BILIRUB SERPL-MCNC: 0.3 MG/DL (ref 0.2–1)
BUN SERPL-MCNC: 13 MG/DL (ref 6–23)
BUN/CREAT SERPL: 14 (ref 12–20)
CALCIUM SERPL-MCNC: 10 MG/DL (ref 8.5–10.1)
CHLORIDE SERPL-SCNC: 100 MMOL/L (ref 98–107)
CO2 SERPL-SCNC: 31 MMOL/L (ref 21–32)
CREAT SERPL-MCNC: 0.95 MG/DL (ref 0.6–1.3)
GLOBULIN SER CALC-MCNC: 3.3 G/DL (ref 2–4)
GLUCOSE SERPL-MCNC: 99 MG/DL (ref 74–108)
MAGNESIUM SERPL-MCNC: 2.3 MG/DL (ref 1.6–2.6)
POTASSIUM SERPL-SCNC: 4.9 MMOL/L (ref 3.5–5.5)
PROT SERPL-MCNC: 7.6 G/DL (ref 6.4–8.2)
SODIUM SERPL-SCNC: 141 MMOL/L (ref 136–145)
T PALLIDUM AB SER QL IA: NONREACTIVE

## 2025-06-03 PROCEDURE — 36415 COLL VENOUS BLD VENIPUNCTURE: CPT

## 2025-06-03 PROCEDURE — 87491 CHLMYD TRACH DNA AMP PROBE: CPT

## 2025-06-03 PROCEDURE — 87350 HEPATITIS BE AG IA: CPT

## 2025-06-03 PROCEDURE — 80053 COMPREHEN METABOLIC PANEL: CPT

## 2025-06-03 PROCEDURE — 87591 N.GONORRHOEAE DNA AMP PROB: CPT

## 2025-06-03 PROCEDURE — 83735 ASSAY OF MAGNESIUM: CPT

## 2025-06-03 PROCEDURE — 87661 TRICHOMONAS VAGINALIS AMPLIF: CPT

## 2025-06-03 PROCEDURE — 99213 OFFICE O/P EST LOW 20 MIN: CPT | Performed by: NURSE PRACTITIONER

## 2025-06-03 PROCEDURE — 86780 TREPONEMA PALLIDUM: CPT

## 2025-06-03 PROCEDURE — 87389 HIV-1 AG W/HIV-1&-2 AB AG IA: CPT

## 2025-06-03 SDOH — ECONOMIC STABILITY: FOOD INSECURITY: WITHIN THE PAST 12 MONTHS, THE FOOD YOU BOUGHT JUST DIDN'T LAST AND YOU DIDN'T HAVE MONEY TO GET MORE.: NEVER TRUE

## 2025-06-03 SDOH — ECONOMIC STABILITY: FOOD INSECURITY: WITHIN THE PAST 12 MONTHS, YOU WORRIED THAT YOUR FOOD WOULD RUN OUT BEFORE YOU GOT MONEY TO BUY MORE.: NEVER TRUE

## 2025-06-03 SDOH — ECONOMIC STABILITY: INCOME INSECURITY: IN THE LAST 12 MONTHS, WAS THERE A TIME WHEN YOU WERE NOT ABLE TO PAY THE MORTGAGE OR RENT ON TIME?: NO

## 2025-06-03 SDOH — ECONOMIC STABILITY: TRANSPORTATION INSECURITY
IN THE PAST 12 MONTHS, HAS THE LACK OF TRANSPORTATION KEPT YOU FROM MEDICAL APPOINTMENTS OR FROM GETTING MEDICATIONS?: NO

## 2025-06-03 SDOH — ECONOMIC STABILITY: TRANSPORTATION INSECURITY
IN THE PAST 12 MONTHS, HAS LACK OF TRANSPORTATION KEPT YOU FROM MEETINGS, WORK, OR FROM GETTING THINGS NEEDED FOR DAILY LIVING?: NO

## 2025-06-03 ASSESSMENT — ANXIETY QUESTIONNAIRES
7. FEELING AFRAID AS IF SOMETHING AWFUL MIGHT HAPPEN: NOT AT ALL
5. BEING SO RESTLESS THAT IT IS HARD TO SIT STILL: NOT AT ALL
3. WORRYING TOO MUCH ABOUT DIFFERENT THINGS: MORE THAN HALF THE DAYS
IF YOU CHECKED OFF ANY PROBLEMS ON THIS QUESTIONNAIRE, HOW DIFFICULT HAVE THESE PROBLEMS MADE IT FOR YOU TO DO YOUR WORK, TAKE CARE OF THINGS AT HOME, OR GET ALONG WITH OTHER PEOPLE: SOMEWHAT DIFFICULT
2. NOT BEING ABLE TO STOP OR CONTROL WORRYING: SEVERAL DAYS
1. FEELING NERVOUS, ANXIOUS, OR ON EDGE: SEVERAL DAYS
3. WORRYING TOO MUCH ABOUT DIFFERENT THINGS: MORE THAN HALF THE DAYS
4. TROUBLE RELAXING: SEVERAL DAYS
2. NOT BEING ABLE TO STOP OR CONTROL WORRYING: SEVERAL DAYS
GAD7 TOTAL SCORE: 6
7. FEELING AFRAID AS IF SOMETHING AWFUL MIGHT HAPPEN: NOT AT ALL
6. BECOMING EASILY ANNOYED OR IRRITABLE: SEVERAL DAYS
IF YOU CHECKED OFF ANY PROBLEMS ON THIS QUESTIONNAIRE, HOW DIFFICULT HAVE THESE PROBLEMS MADE IT FOR YOU TO DO YOUR WORK, TAKE CARE OF THINGS AT HOME, OR GET ALONG WITH OTHER PEOPLE: SOMEWHAT DIFFICULT
5. BEING SO RESTLESS THAT IT IS HARD TO SIT STILL: NOT AT ALL
1. FEELING NERVOUS, ANXIOUS, OR ON EDGE: SEVERAL DAYS
6. BECOMING EASILY ANNOYED OR IRRITABLE: SEVERAL DAYS
4. TROUBLE RELAXING: SEVERAL DAYS

## 2025-06-03 ASSESSMENT — PATIENT HEALTH QUESTIONNAIRE - PHQ9
2. FEELING DOWN, DEPRESSED OR HOPELESS: NOT AT ALL
SUM OF ALL RESPONSES TO PHQ QUESTIONS 1-9: 0
1. LITTLE INTEREST OR PLEASURE IN DOING THINGS: NOT AT ALL
SUM OF ALL RESPONSES TO PHQ QUESTIONS 1-9: 0

## 2025-06-04 DIAGNOSIS — R79.89 ELEVATED LFTS: Primary | ICD-10-CM

## 2025-06-04 LAB
C TRACH RRNA SPEC QL NAA+PROBE: NEGATIVE
HBV E AG SERPL QL IA: NEGATIVE
HIV 1+2 AB+HIV1 P24 AG SERPL QL IA: NONREACTIVE
HIV 1/2 RESULT COMMENT: NORMAL
N GONORRHOEA RRNA SPEC QL NAA+PROBE: NEGATIVE
SPECIMEN SOURCE: NORMAL
T VAGINALIS RRNA SPEC QL NAA+PROBE: NEGATIVE